# Patient Record
Sex: FEMALE | Race: WHITE | NOT HISPANIC OR LATINO | URBAN - METROPOLITAN AREA
[De-identification: names, ages, dates, MRNs, and addresses within clinical notes are randomized per-mention and may not be internally consistent; named-entity substitution may affect disease eponyms.]

---

## 2024-01-16 ENCOUNTER — HOSPITAL ENCOUNTER (OUTPATIENT)
Dept: RADIOLOGY | Facility: CLINIC | Age: 59
Discharge: HOME | End: 2024-01-16
Payer: COMMERCIAL

## 2024-01-16 ENCOUNTER — OFFICE VISIT (OUTPATIENT)
Dept: URGENT CARE | Facility: CLINIC | Age: 59
End: 2024-01-16
Payer: COMMERCIAL

## 2024-01-16 VITALS
HEART RATE: 86 BPM | TEMPERATURE: 97.8 F | SYSTOLIC BLOOD PRESSURE: 146 MMHG | OXYGEN SATURATION: 97 % | RESPIRATION RATE: 16 BRPM | WEIGHT: 179 LBS | DIASTOLIC BLOOD PRESSURE: 89 MMHG

## 2024-01-16 DIAGNOSIS — S89.91XA KNEE INJURY, RIGHT, INITIAL ENCOUNTER: Primary | ICD-10-CM

## 2024-01-16 DIAGNOSIS — S80.01XA CONTUSION OF RIGHT KNEE, INITIAL ENCOUNTER: ICD-10-CM

## 2024-01-16 DIAGNOSIS — S89.91XA KNEE INJURY, RIGHT, INITIAL ENCOUNTER: ICD-10-CM

## 2024-01-16 PROCEDURE — A6449 LT COMPRES BAND >=3" <5"/YD: HCPCS | Performed by: PHYSICIAN ASSISTANT

## 2024-01-16 PROCEDURE — 99204 OFFICE O/P NEW MOD 45 MIN: CPT | Performed by: PHYSICIAN ASSISTANT

## 2024-01-16 PROCEDURE — 73564 X-RAY EXAM KNEE 4 OR MORE: CPT | Mod: RIGHT SIDE | Performed by: RADIOLOGY

## 2024-01-16 PROCEDURE — 73564 X-RAY EXAM KNEE 4 OR MORE: CPT | Mod: RT,FR

## 2024-01-16 ASSESSMENT — PAIN SCALES - GENERAL: PAINLEVEL: 5

## 2024-01-16 NOTE — PROGRESS NOTES
History provided by:  Patient   used: No    [facility] claim-  This is a 58 yr old female here for right knee pain. Fell on icy concrete this am. Fell on a twisted bent knee. Persistant pain with ambulation. No giving way sensation, bruising or swelling. Superficial small abrasion present.   /89   Pulse 86   Temp 36.6 °C (97.8 °F)   Resp 16   Wt 81.2 kg (179 lb)   SpO2 97%     Physical Exam  Vitals and nursing note reviewed.   Constitutional:       Appearance: Normal appearance.   HENT:      Head: Normocephalic and atraumatic.   Cardiovascular:      Rate and Rhythm: Normal rate and regular rhythm.   Pulmonary:      Effort: Pulmonary effort is normal.      Breath sounds: Normal breath sounds.   Musculoskeletal:      Comments: Right knee-anterior pain with palpation, small superficial abrasion present. FROM, no jt laxity, negative mcmurrays.  Distal n-v intact   Skin:     General: Skin is warm and dry.   Neurological:      General: No focal deficit present.      Mental Status: She is alert and oriented to person, place, and time.   Psychiatric:         Mood and Affect: Mood normal.         Behavior: Behavior normal.     Assessment:  Right knee contusion    Plan:  Right knee xray-no effusion and no fx  Ace wrap for comfort-dispensed  Tylenol as directed for pain  Ice and elevate knee 2-3 times a day  Occ med follow up this week  ER visit anytime 24/7 for acute worsening or changing condition    _____________MEDCO-14 Physician's Report of Work Ability [facility]-[id number]_________________      Injured Worker:  Date of injury: Claim number:   Cheryl Erwin 1/16/2024      Date of last appointment /examination: Date of this appointment /examination:  Date of next appointment /examination:       01/16/24       Employer name: Injured worker's position of employment at time of injury:            MEDCO-14 submission   1.  Please select one of the following options: I have never completed a MEDCO-14.  Proceed to section 2.         Employment/Occupation (Complete this section and proceed to section 3)  2. Updates made to Employment/Occupation Section: Yes    Have you reviewed the description of the injured worker's job held on the date of the injury (former position of employment)? Yes, job description provided by: Injured worker       Work Status/Injured worker's capabilities.   3a. Updates made to work status/injured worker's capabilities: Yes    Does the injured worker have any physical or health restrictions related to allowed conditions in the claim? No, the injured worker is released to work as of the date of this exam. Proceed to Section 8     3b. If restrictions, can the injured worker return to full duties of his/her job held on the date of injury (former position of employment)?     Yes. The injured worker is released to work as of the date of this exam. Proceed to Section 8.   3c. Please indicate which of the activities listed below the injured worker can perform (even if the response to 3B is No.)     The injured worker is not released to the former position of employment but may return to available and appropriate work with restrictions, the possible return to work date: 1/16/2024.     The injured worker can perform simple grasping with: Both  The injured worker can perform repetitive wrist motion with: Both  The injured worker's dominant hand is:   The injured worker can perform repetitive actions to operate foot controls or motor vehicles with: Both    If the injured worker is taking prescribed medications for the allowed conditions in this claim, can the injured worker safely:   *Operate heavy machinery: No  *Drive: Yes  *Perform other critical job tasks as defined by any source listed above in section 2: Yes     Please indicate the following: Never, Occasionally, Frequently, Continuously    Activity   Bend: Frequently  Squat / kneel: Occasionally  Twist / turn: Occasionally  Climb: Never Reach  "above shoulder: Frequently  Type / Keyboard: Frequently  Work w/cold substances: Frequently  Work w/hot substances: Frequently      Lifting/Carrying                                      Pushing/pulling  0 - 10 lbs: Frequently  11 - 20 lbs: Frequently  21 - 40 lbs: Frequently  41 - 60 lbs: Occasionally  61 - 100 lbs: Occasionally 0 - 25 lbs: Frequently  26 - 40 lbs: Frequently  41 -  60 lbs: Occasionally  61 - 100 lbs: Never  100 + lbs: Never       How many total hours can the injured worker work?  5 days per week, 8 hours per day     In an eight-hour workday, how many total hours is the injured worker potentially able to work?  8    Sit: 4 hours,  With Break  Walk: 2 hours, With Break  Stand: 2 hours, With Break          Does the injured worker have any functional restrictions based only on allowed psychological conditions? No    *Note: If Yes is indicated please reference the MEDCO-16 as needed.     Additionally, in this space please provide any additional information addressing the  injured worker's capabilities and/or job accommodations which may not be addressed above. N/A       Disability information   4a.  *Note: If 3B above is \"No\" or dates updated - all 4A fields, including site/location if applicable must be completed    Updates: No    Complete the chart below and furnish the narrative description of the diagnosis(es), site/location, if applicable, and ICD code for the conditions being treated due to the work- related injury/disease.  Please indicate if the condition is preventing the injured worker from returning to job duties he/she held on the date of injury.       Narrative description of the work-related allowed condition Site/Location if applicable ICD Code Is the condition preventing full duty release to the job injured worker held on date of injury?    Knee contusion right  No                                      4B. List all other relevant conditions that impact treatment of the conditions listed " above (e.g., co-morbidities or not yet allowed conditions).        Clinical findings:    5. You can reference office notes in lieu of writing clinical findings below.  Updates: Yes    The injured worker is progressing: As expected    Provide your clinical and objective findings supporting your medical opinion outlined on this form.  List barriers to return to work and reason, for the injured worker's delay in recovery.   N/A     Maximum medical improvement (MMI):  6. Updates: No    MMI is a treatment plateau (static or well-stabilized) at which no fundamental       functional or physiological change can be expected within reasonable medical       probability, in spite of continuing medical or rehabilitative procedures.     Has the work-related injury(s) or occupational disease reached MMI based on the definition above? No, the proposed treatment plan, including estimated duration of each treatment  .      *Note: An injured worker may need supportive treatment to maintain his or her level of function after reaching MMI. Thus, periodic medical treatment may still be requested and provided.      Vocational rehabilitation:   7. Updates: No    Vocational rehabilitation is an individualized and voluntary program for an eligible injured worker who needs assistance in safely returning to work or in retaining employment.  This program can be tailored around an injured worker's restrictions and may provide job seeking skills or necessary retraining. Is the injured worker a candidate for vocational rehabilitation services focusing on return to work? No.  Please explain why and provide your recommendations to help the injured worker return to employment:       Treating physician signature - mandatory:  8. I certify the information on this form is correct to the best of my knowledge. I am aware that any person who knowingly makes a false statement, misrepresentation, concealment of fact or any other act of fraud to obtain  payment as provided by [facility], or who knowingly accepts payment to which that person is not entitled, is subject to felony criminal prosecution and may be punished, under appropriate criminal provisions. by a fine or imprisonment or both.     Treating physician's name (please print legibly): Charlee Mckeon PA-C  [facility] provider ([place]) number:    Complete Address, Telephone, Fax number and Date:      Treating physician's signature: Charlee Mckeon PA-C

## 2024-01-16 NOTE — PATIENT INSTRUCTIONS
Tylenol as directed for pain  Ice and elevate knee 2-3 times a day  Ace wrap for comfort  Occ med follow up this week  ER visit anytime 24/7 for acute worsening or changing condition